# Patient Record
Sex: FEMALE | Race: WHITE | Employment: FULL TIME | URBAN - METROPOLITAN AREA
[De-identification: names, ages, dates, MRNs, and addresses within clinical notes are randomized per-mention and may not be internally consistent; named-entity substitution may affect disease eponyms.]

---

## 2018-11-12 ENCOUNTER — HOSPITAL ENCOUNTER (OUTPATIENT)
Dept: CT IMAGING | Age: 28
Discharge: HOME | End: 2018-11-12
Attending: NURSE PRACTITIONER
Payer: COMMERCIAL

## 2018-11-12 DIAGNOSIS — S09.90XA HEAD INJURY: ICD-10-CM

## 2018-11-12 DIAGNOSIS — R11.2 NAUSEA & VOMITING: ICD-10-CM

## 2018-11-12 DIAGNOSIS — M43.6 NECK STIFFNESS: ICD-10-CM

## 2018-11-12 PROCEDURE — 72125 CT NECK SPINE W/O DYE: CPT

## 2018-11-12 PROCEDURE — 70450 CT HEAD/BRAIN W/O DYE: CPT

## 2022-11-02 ENCOUNTER — OFFICE VISIT (OUTPATIENT)
Dept: ENT CLINIC | Age: 32
End: 2022-11-02

## 2022-11-02 VITALS — WEIGHT: 130 LBS | BODY MASS INDEX: 24.55 KG/M2 | RESPIRATION RATE: 18 BRPM | HEIGHT: 61 IN

## 2022-11-02 DIAGNOSIS — S01.311A LACERATION OF EARLOBE, RIGHT, INITIAL ENCOUNTER: Primary | ICD-10-CM

## 2022-11-02 RX ORDER — ESCITALOPRAM OXALATE 5 MG/1
TABLET ORAL DAILY
COMMUNITY
Start: 2022-06-30 | End: 2023-06-30

## 2022-11-02 ASSESSMENT — ENCOUNTER SYMPTOMS
APNEA: 0
COLOR CHANGE: 0
EYE DISCHARGE: 0
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
## 2022-11-09 ENCOUNTER — OFFICE VISIT (OUTPATIENT)
Dept: ENT CLINIC | Age: 32
End: 2022-11-09

## 2022-11-09 VITALS — RESPIRATION RATE: 18 BRPM | HEIGHT: 61 IN | BODY MASS INDEX: 24.92 KG/M2 | WEIGHT: 132 LBS

## 2022-11-09 DIAGNOSIS — S01.311D LACERATION OF RIGHT EAR LOBE, SUBSEQUENT ENCOUNTER: Primary | ICD-10-CM

## 2022-11-09 RX ORDER — CEFUROXIME AXETIL 500 MG/1
500 TABLET ORAL 2 TIMES DAILY
Qty: 10 TABLET | Refills: 0 | Status: SHIPPED | OUTPATIENT
Start: 2022-11-09 | End: 2022-11-14

## 2022-11-09 ASSESSMENT — ENCOUNTER SYMPTOMS
COLOR CHANGE: 0
EYE DISCHARGE: 0
APNEA: 0
ABDOMINAL DISTENTION: 0

## 2022-11-09 NOTE — PROGRESS NOTES
HPI:  Alexandre Anaya is a 29 y.o. female seen Established   Chief Complaint   Patient presents with    Ear Problem     Patient presents today for ear lob repair. 82-year-old female presents for a follow-up evaluation today for procedure for repair of right-sided split earlobe with Z-plasty reconstruction. There is been no changes and continues to have deformity sustained while at work with traumatic injury of right-sided split earlobe from her ear piercing. Past Medical History, Past Surgical History, Family history, Social History, and Medications were all reviewed with the patient today and updated as necessary. No Known Allergies    There is no problem list on file for this patient. Current Outpatient Medications   Medication Sig    cefUROXime (CEFTIN) 500 MG tablet Take 1 tablet by mouth 2 times daily for 5 days    mupirocin (BACTROBAN) 2 % ointment Apply topically 3 times daily. escitalopram (LEXAPRO) 5 MG tablet Take by mouth daily     No current facility-administered medications for this visit. History reviewed. No pertinent past medical history. History reviewed. No pertinent surgical history. Social History     Tobacco Use    Smoking status: Never    Smokeless tobacco: Never   Substance Use Topics    Alcohol use: Yes     Comment: socially       History reviewed. No pertinent family history. ROS:    Review of Systems   Constitutional:  Negative for activity change. HENT:  Negative for congestion. Eyes:  Negative for discharge. Respiratory:  Negative for apnea. Cardiovascular:  Negative for chest pain. Gastrointestinal:  Negative for abdominal distention. Endocrine: Negative for cold intolerance. Genitourinary:  Negative for difficulty urinating. Musculoskeletal:  Negative for arthralgias. Skin:  Negative for color change. Allergic/Immunologic: Negative for environmental allergies. Neurological:  Negative for dizziness.    Hematological: Negative for adenopathy. Psychiatric/Behavioral:  Negative for agitation. PHYSICAL EXAM:    Resp 18   Ht 5' 1\" (1.549 m)   Wt 132 lb (59.9 kg)   BMI 24.94 kg/m²     Physical Exam  Vitals and nursing note reviewed. Constitutional:       Appearance: Normal appearance. HENT:      Head: Normocephalic and atraumatic. Right Ear: Tympanic membrane and ear canal normal. There is no impacted cerumen. Left Ear: Tympanic membrane, ear canal and external ear normal. There is no impacted cerumen. Ears:      Comments: Right-sided external ear lobule split skin deformity well-healed. Nose: Nose normal. No congestion or rhinorrhea. Mouth/Throat:      Mouth: Mucous membranes are moist.      Pharynx: Oropharynx is clear. No oropharyngeal exudate or posterior oropharyngeal erythema. Eyes:      General: No scleral icterus. Pulmonary:      Effort: Pulmonary effort is normal.   Musculoskeletal:      Cervical back: Normal range of motion and neck supple. No rigidity. Lymphadenopathy:      Cervical: No cervical adenopathy. Skin:     General: Skin is warm and dry. Neurological:      Mental Status: She is alert and oriented to person, place, and time. Psychiatric:         Mood and Affect: Mood normal.         Behavior: Behavior normal.          Procedure: Right-sided split earlobe repair via Z-plasty reconstruction  Indication: Right-sided split earlobe deformity  Description: Long discussion of all risk benefits and alternatives to Z-plasty earlobe repair reviewed and informed consent was obtained and signed. A total of 1 cc of 2% lidocaine with 1 100,000 epinephrine was instilled at the medial base of the earlobe to perform a anesthetized block to the inferior one third of the external ear. After sufficient amount of time it was confirmed to have anesthesia effect. A marking pen was then used to delineate the scar tissue of the split earlobe and a Z-plasty preparation.   Iodine solution was used to cleanse the earlobe and surrounding soft tissue. Exam showed right-sided earlobe well-healed split defect approximately 2 cm in total length. Next #15 blade scalpel was then used to trim the remove scar tissue from the split edges. #15 blade scalpel was then also used to perform Z-plasty cuts through the full-thickness of earlobe. The entirety of the Z-plasty length was approximately 2.5 cm in total linear dimensions. Minimal bloody ooze was controlled with silver nitrate stick cauterization and gauze pressure. Next the Z-plasty soft tissue was undermined and elevated and soft tissue was then rotated via local rotational flap with good reapproximation of tissues and a more normal anatomical appearance of the earlobe. The skin edges were then reapproximated using simple interrupted 6-0 Vicryl suture and a vertical mattress suture was then placed at the rim of the inferior border of the lobule to ensure good skin and skin approximation and eversion. The area was then cleansed with sterile saline and antibiotic ointment was applied and covered with a Band-Aid. Patient tolerated well no complications. ASSESSMENT and PLAN        ICD-10-CM    1. Laceration of right ear lobe, subsequent encounter  S01.311D           Patient underwent repair of right sided earlobe split deformity via Z-plasty repair as above with no complications. She should place Bactroban ointment to the sutures and incision site 3 times daily and take her prophylactic antibiotics as prescribed.   Follow-up in 6 days for suture removal.    Brent Bowman DO  11/10/2022

## 2022-11-15 ENCOUNTER — OFFICE VISIT (OUTPATIENT)
Dept: ENT CLINIC | Age: 32
End: 2022-11-15

## 2022-11-15 VITALS — BODY MASS INDEX: 24.92 KG/M2 | WEIGHT: 132 LBS | HEIGHT: 61 IN

## 2022-11-15 DIAGNOSIS — S01.311D LACERATION OF RIGHT EAR LOBE, SUBSEQUENT ENCOUNTER: Primary | ICD-10-CM

## 2022-11-15 PROCEDURE — 99024 POSTOP FOLLOW-UP VISIT: CPT | Performed by: STUDENT IN AN ORGANIZED HEALTH CARE EDUCATION/TRAINING PROGRAM

## 2022-11-15 NOTE — PROGRESS NOTES
drainage or edema present. ASSESSMENT and PLAN        ICD-10-CM    1. Laceration of right ear lobe, subsequent encounter  S01.311D           Following suture removal 2 additional simple interrupted sutures were placed at 2 small locations of dehiscence which is most likely following her significant earlobe swelling event this past weekend. We discussed that this may have been a temporary postoperative infection that is now resolved. This will help with cosmesis long-term. Recommend continued mupirocin ointment 3 times daily and I have sent a new prescription for oral antibiotics Bactrim twice daily for 5 days.   Follow-up in 1 week for suture removal.    Nancy Durham DO  11/16/2022

## 2022-11-16 RX ORDER — SULFAMETHOXAZOLE AND TRIMETHOPRIM 400; 80 MG/1; MG/1
1 TABLET ORAL 2 TIMES DAILY
Qty: 10 TABLET | Refills: 0 | Status: SHIPPED | OUTPATIENT
Start: 2022-11-16 | End: 2022-11-21

## 2022-11-21 ENCOUNTER — OFFICE VISIT (OUTPATIENT)
Dept: ENT CLINIC | Age: 32
End: 2022-11-21

## 2022-11-21 VITALS — WEIGHT: 132 LBS | HEIGHT: 61 IN | RESPIRATION RATE: 18 BRPM | BODY MASS INDEX: 24.92 KG/M2

## 2022-11-21 DIAGNOSIS — S01.311D LACERATION OF RIGHT EAR LOBE, SUBSEQUENT ENCOUNTER: Primary | ICD-10-CM

## 2022-11-21 PROCEDURE — 99024 POSTOP FOLLOW-UP VISIT: CPT | Performed by: STUDENT IN AN ORGANIZED HEALTH CARE EDUCATION/TRAINING PROGRAM

## 2022-11-21 ASSESSMENT — ENCOUNTER SYMPTOMS
ABDOMINAL DISTENTION: 0
APNEA: 0
EYE DISCHARGE: 0
COLOR CHANGE: 0

## 2022-11-21 NOTE — PROGRESS NOTES
HPI:  Bety Obrien is a 29 y.o. female seen Established   Chief Complaint   Patient presents with    Follow-up     Patient presents today for suture removal .          26-year-old female presents for follow-up evaluation 1 week status post placement of 2 sutures to the right earlobe now approximately just under 2-week status post earlobe repair for history of split earlobe. She has been doing well having had no additional acute swelling or pain to the right ear. Minimal tenderness. She continues to use antibiotic ointment. Presents today for suture removal.    Past Medical History, Past Surgical History, Family history, Social History, and Medications were all reviewed with the patient today and updated as necessary. No Known Allergies    There is no problem list on file for this patient. Current Outpatient Medications   Medication Sig    escitalopram (LEXAPRO) 5 MG tablet Take by mouth daily     No current facility-administered medications for this visit. History reviewed. No pertinent past medical history. History reviewed. No pertinent surgical history. Social History     Tobacco Use    Smoking status: Never    Smokeless tobacco: Never   Substance Use Topics    Alcohol use: Yes     Comment: socially       History reviewed. No pertinent family history. ROS:    Review of Systems   Constitutional:  Negative for activity change. HENT:  Negative for congestion. Eyes:  Negative for discharge. Respiratory:  Negative for apnea. Cardiovascular:  Negative for chest pain. Gastrointestinal:  Negative for abdominal distention. Endocrine: Negative for cold intolerance. Genitourinary:  Negative for difficulty urinating. Musculoskeletal:  Negative for arthralgias. Skin:  Negative for color change. Allergic/Immunologic: Negative for environmental allergies. Neurological:  Negative for dizziness. Hematological:  Negative for adenopathy.    Psychiatric/Behavioral:  Negative for agitation. PHYSICAL EXAM:    Resp 18   Ht 5' 1\" (1.549 m)   Wt 132 lb (59.9 kg)   BMI 24.94 kg/m²     Physical Exam     Right earlobe incision clean dry and intact with minimal crusting. No erythema or drainage. Minimal tenderness. 2 single interrupted Prolene sutures cut and removed. Coated with antibiotic ointment. ASSESSMENT and PLAN        ICD-10-CM    1. Laceration of right ear lobe, subsequent encounter  S01.311D           Patient was reassured of a good normal expected closure of the incision site without evidence of soft tissue infection. Good cosmetic appearance. She can continue with mupirocin ointment twice daily for 2 days and then we have discussed long-term protection from sun exposure and using sunscreens. She can follow-up in the future if she has any further concerns.     Judge Ayala,   11/22/2022

## 2023-02-23 ENCOUNTER — APPOINTMENT (RX ONLY)
Dept: URBAN - METROPOLITAN AREA CLINIC 329 | Facility: CLINIC | Age: 33
Setting detail: DERMATOLOGY
End: 2023-02-23

## 2023-02-23 DIAGNOSIS — D22 MELANOCYTIC NEVI: ICD-10-CM

## 2023-02-23 DIAGNOSIS — L20.89 OTHER ATOPIC DERMATITIS: ICD-10-CM

## 2023-02-23 DIAGNOSIS — Q826 OTHER SPECIFIED ANOMALIES OF SKIN: ICD-10-CM

## 2023-02-23 DIAGNOSIS — Q819 OTHER SPECIFIED ANOMALIES OF SKIN: ICD-10-CM

## 2023-02-23 DIAGNOSIS — Q828 OTHER SPECIFIED ANOMALIES OF SKIN: ICD-10-CM

## 2023-02-23 DIAGNOSIS — D18.0 HEMANGIOMA: ICD-10-CM

## 2023-02-23 PROBLEM — D22.62 MELANOCYTIC NEVI OF LEFT UPPER LIMB, INCLUDING SHOULDER: Status: ACTIVE | Noted: 2023-02-23

## 2023-02-23 PROBLEM — D18.01 HEMANGIOMA OF SKIN AND SUBCUTANEOUS TISSUE: Status: ACTIVE | Noted: 2023-02-23

## 2023-02-23 PROBLEM — L85.8 OTHER SPECIFIED EPIDERMAL THICKENING: Status: ACTIVE | Noted: 2023-02-23

## 2023-02-23 PROCEDURE — 99203 OFFICE O/P NEW LOW 30 MIN: CPT

## 2023-02-23 PROCEDURE — ? SUNSCREEN RECOMMENDATIONS

## 2023-02-23 PROCEDURE — ? FULL BODY SKIN EXAM - DECLINED

## 2023-02-23 PROCEDURE — ? PRESCRIPTION

## 2023-02-23 PROCEDURE — ? ADDITIONAL NOTES

## 2023-02-23 PROCEDURE — ? COUNSELING

## 2023-02-23 RX ORDER — TRIAMCINOLONE ACETONIDE 1 MG/G
CREAM TOPICAL BID
Qty: 80 | Refills: 3 | Status: ERX | COMMUNITY
Start: 2023-02-23

## 2023-02-23 RX ADMIN — TRIAMCINOLONE ACETONIDE: 1 CREAM TOPICAL at 00:00

## 2023-02-23 ASSESSMENT — LOCATION DETAILED DESCRIPTION DERM
LOCATION DETAILED: LEFT ANTERIOR SHOULDER
LOCATION DETAILED: LEFT PROXIMAL DORSAL FOREARM
LOCATION DETAILED: LEFT ANTERIOR PROXIMAL THIGH

## 2023-02-23 ASSESSMENT — LOCATION SIMPLE DESCRIPTION DERM
LOCATION SIMPLE: LEFT FOREARM
LOCATION SIMPLE: LEFT THIGH
LOCATION SIMPLE: LEFT SHOULDER

## 2023-02-23 ASSESSMENT — LOCATION ZONE DERM
LOCATION ZONE: LEG
LOCATION ZONE: ARM

## 2023-02-23 NOTE — PROCEDURE: ADDITIONAL NOTES
Render Risk Assessment In Note?: no
Additional Notes: Suggested Uridin Podose to use on red areas.
Detail Level: Simple
Yes

## 2023-02-23 NOTE — HPI: RASH
How Severe Is Your Rash?: mild
Is This A New Presentation, Or A Follow-Up?: Rash
Additional History: Patient reports having a family hx of psoriasis and is concerned that this rash may be psoriasis, she states that the patches are itchy and red. She has only used OTC products for this issue. She is present to discuss tx option.

## 2023-03-28 ENCOUNTER — APPOINTMENT (RX ONLY)
Dept: URBAN - METROPOLITAN AREA CLINIC 329 | Facility: CLINIC | Age: 33
Setting detail: DERMATOLOGY
End: 2023-03-28

## 2023-03-28 DIAGNOSIS — D22 MELANOCYTIC NEVI: ICD-10-CM

## 2023-03-28 DIAGNOSIS — D18.0 HEMANGIOMA: ICD-10-CM

## 2023-03-28 DIAGNOSIS — Q828 OTHER SPECIFIED ANOMALIES OF SKIN: ICD-10-CM

## 2023-03-28 DIAGNOSIS — L21.8 OTHER SEBORRHEIC DERMATITIS: ICD-10-CM

## 2023-03-28 DIAGNOSIS — L81.4 OTHER MELANIN HYPERPIGMENTATION: ICD-10-CM

## 2023-03-28 DIAGNOSIS — L82.1 OTHER SEBORRHEIC KERATOSIS: ICD-10-CM

## 2023-03-28 DIAGNOSIS — L24 IRRITANT CONTACT DERMATITIS: ICD-10-CM

## 2023-03-28 DIAGNOSIS — Q826 OTHER SPECIFIED ANOMALIES OF SKIN: ICD-10-CM

## 2023-03-28 DIAGNOSIS — Q819 OTHER SPECIFIED ANOMALIES OF SKIN: ICD-10-CM

## 2023-03-28 PROBLEM — L85.8 OTHER SPECIFIED EPIDERMAL THICKENING: Status: ACTIVE | Noted: 2023-03-28

## 2023-03-28 PROBLEM — L24.9 IRRITANT CONTACT DERMATITIS, UNSPECIFIED CAUSE: Status: ACTIVE | Noted: 2023-03-28

## 2023-03-28 PROBLEM — D22.5 MELANOCYTIC NEVI OF TRUNK: Status: ACTIVE | Noted: 2023-03-28

## 2023-03-28 PROBLEM — D18.01 HEMANGIOMA OF SKIN AND SUBCUTANEOUS TISSUE: Status: ACTIVE | Noted: 2023-03-28

## 2023-03-28 PROCEDURE — 99213 OFFICE O/P EST LOW 20 MIN: CPT

## 2023-03-28 PROCEDURE — ? TREATMENT REGIMEN

## 2023-03-28 PROCEDURE — ? FULL BODY SKIN EXAM

## 2023-03-28 PROCEDURE — ? PRESCRIPTION

## 2023-03-28 PROCEDURE — ? COUNSELING

## 2023-03-28 RX ORDER — HYDROCORTISONE 25 MG/G
CREAM TOPICAL
Qty: 30 | Refills: 1 | Status: ERX | COMMUNITY
Start: 2023-03-28

## 2023-03-28 RX ORDER — KETOCONAZOLE 20 MG/ML
SHAMPOO, SUSPENSION TOPICAL
Qty: 120 | Refills: 3 | Status: ERX | COMMUNITY
Start: 2023-03-28

## 2023-03-28 RX ADMIN — KETOCONAZOLE: 20 SHAMPOO, SUSPENSION TOPICAL at 00:00

## 2023-03-28 RX ADMIN — HYDROCORTISONE: 25 CREAM TOPICAL at 00:00

## 2023-03-28 ASSESSMENT — LOCATION DETAILED DESCRIPTION DERM
LOCATION DETAILED: RIGHT MEDIAL UPPER BACK
LOCATION DETAILED: LEFT INFERIOR UPPER BACK
LOCATION DETAILED: LEFT MEDIAL UPPER BACK
LOCATION DETAILED: LEFT PROXIMAL PRETIBIAL REGION
LOCATION DETAILED: INFERIOR THORACIC SPINE
LOCATION DETAILED: LEFT MEDIAL FRONTAL SCALP
LOCATION DETAILED: LEFT SUPERIOR LATERAL BUCCAL CHEEK
LOCATION DETAILED: LEFT CENTRAL PARIETAL SCALP

## 2023-03-28 ASSESSMENT — LOCATION ZONE DERM
LOCATION ZONE: SCALP
LOCATION ZONE: LEG
LOCATION ZONE: FACE
LOCATION ZONE: TRUNK

## 2023-03-28 ASSESSMENT — LOCATION SIMPLE DESCRIPTION DERM
LOCATION SIMPLE: RIGHT UPPER BACK
LOCATION SIMPLE: LEFT PRETIBIAL REGION
LOCATION SIMPLE: SCALP
LOCATION SIMPLE: UPPER BACK
LOCATION SIMPLE: LEFT SCALP
LOCATION SIMPLE: LEFT UPPER BACK
LOCATION SIMPLE: LEFT CHEEK

## 2023-03-28 NOTE — PROCEDURE: TREATMENT REGIMEN
Initiate Treatment: Hydrocortisone- apply to face daily for the next two weeks
Detail Level: Zone
Initiate Treatment: Ketoconazole shampoo- wash scalp with shampoo 3xs a week. Leave in scalp for 5 minutes, then rinse.

## 2023-03-28 NOTE — PROCEDURE: MIPS QUALITY
Detail Level: Zone
Quality 431: Preventive Care And Screening: Unhealthy Alcohol Use - Screening: Patient not identified as an unhealthy alcohol user when screened for unhealthy alcohol use using a systematic screening method
Quality 130: Documentation Of Current Medications In The Medical Record: Current Medications Documented
Quality 226: Preventive Care And Screening: Tobacco Use: Screening And Cessation Intervention: Patient screened for tobacco use and is an ex/non-smoker
Quality 110: Preventive Care And Screening: Influenza Immunization: Influenza Immunization Administered during Influenza season

## 2023-04-10 ENCOUNTER — APPOINTMENT (RX ONLY)
Dept: URBAN - METROPOLITAN AREA CLINIC 329 | Facility: CLINIC | Age: 33
Setting detail: DERMATOLOGY
End: 2023-04-10

## 2023-04-10 DIAGNOSIS — L68.0 HIRSUTISM: ICD-10-CM | Status: INADEQUATELY CONTROLLED

## 2023-04-10 DIAGNOSIS — L21.8 OTHER SEBORRHEIC DERMATITIS: ICD-10-CM | Status: STABLE

## 2023-04-10 DIAGNOSIS — L24 IRRITANT CONTACT DERMATITIS: ICD-10-CM | Status: IMPROVED

## 2023-04-10 PROBLEM — L24.9 IRRITANT CONTACT DERMATITIS, UNSPECIFIED CAUSE: Status: ACTIVE | Noted: 2023-04-10

## 2023-04-10 PROCEDURE — ? ADDITIONAL NOTES

## 2023-04-10 PROCEDURE — ? FULL BODY SKIN EXAM - DECLINED

## 2023-04-10 PROCEDURE — ? COUNSELING

## 2023-04-10 PROCEDURE — ? PRESCRIPTION MEDICATION MANAGEMENT

## 2023-04-10 PROCEDURE — 99213 OFFICE O/P EST LOW 20 MIN: CPT

## 2023-04-10 ASSESSMENT — LOCATION SIMPLE DESCRIPTION DERM
LOCATION SIMPLE: SUBMENTAL CHIN
LOCATION SIMPLE: LEFT SCALP
LOCATION SIMPLE: LEFT CHEEK

## 2023-04-10 ASSESSMENT — LOCATION DETAILED DESCRIPTION DERM
LOCATION DETAILED: LEFT MEDIAL FRONTAL SCALP
LOCATION DETAILED: SUBMENTAL CHIN
LOCATION DETAILED: LEFT SUPERIOR LATERAL BUCCAL CHEEK

## 2023-04-10 ASSESSMENT — LOCATION ZONE DERM
LOCATION ZONE: FACE
LOCATION ZONE: SCALP

## 2023-04-10 NOTE — PROCEDURE: PRESCRIPTION MEDICATION MANAGEMENT
Detail Level: Zone
Plan: Will treat revisit rosacea once off hydrocortisone. May also consider Tretinoin once clear.
Discontinue Regimen: Hydrocortisone
Render In Strict Bullet Format?: No
Continue Regimen: Ketoconazole shampoo prn flares

## 2023-04-10 NOTE — PROCEDURE: ADDITIONAL NOTES
Additional Notes: Recommend laser for hair removal. Will consult obgyn for possible pcos work up.
Render Risk Assessment In Note?: no
Detail Level: Simple

## 2023-07-26 ENCOUNTER — TRANSCRIBE ORDERS (OUTPATIENT)
Dept: OCCUPATIONAL MEDICINE | Age: 33
End: 2023-07-26

## 2023-07-26 ENCOUNTER — HOSPITAL ENCOUNTER (OUTPATIENT)
Dept: GENERAL RADIOLOGY | Age: 33
Discharge: HOME | End: 2023-07-29

## 2023-07-26 DIAGNOSIS — T14.90XA INJURY: Primary | ICD-10-CM

## 2023-07-26 DIAGNOSIS — T14.90XA INJURY: ICD-10-CM

## 2023-07-26 PROCEDURE — 73140 X-RAY EXAM OF FINGER(S): CPT

## 2024-01-07 ENCOUNTER — HOSPITAL ENCOUNTER (EMERGENCY)
Age: 34
Discharge: HOME | End: 2024-01-07
Payer: OTHER MISCELLANEOUS

## 2024-01-07 ENCOUNTER — APPOINTMENT (OUTPATIENT)
Dept: GENERAL RADIOLOGY | Age: 34
End: 2024-01-07
Payer: OTHER MISCELLANEOUS

## 2024-01-07 VITALS
HEART RATE: 101 BPM | SYSTOLIC BLOOD PRESSURE: 122 MMHG | OXYGEN SATURATION: 97 % | BODY MASS INDEX: 23.98 KG/M2 | DIASTOLIC BLOOD PRESSURE: 87 MMHG | HEIGHT: 61 IN | RESPIRATION RATE: 19 BRPM | TEMPERATURE: 98.4 F | WEIGHT: 127 LBS

## 2024-01-07 DIAGNOSIS — S62.610A CLOSED DISPLACED FRACTURE OF PROXIMAL PHALANX OF RIGHT INDEX FINGER, INITIAL ENCOUNTER: Primary | ICD-10-CM
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
## 2024-01-07 PROCEDURE — 29125 APPL SHORT ARM SPLINT STATIC: CPT

## 2024-01-07 PROCEDURE — 99283 EMERGENCY DEPT VISIT LOW MDM: CPT

## 2024-01-07 PROCEDURE — 6370000000 HC RX 637 (ALT 250 FOR IP): Performed by: PHYSICIAN ASSISTANT

## 2024-01-07 PROCEDURE — 73130 X-RAY EXAM OF HAND: CPT

## 2024-01-07 RX ORDER — TRAMADOL HYDROCHLORIDE 50 MG/1
50 TABLET ORAL
Status: COMPLETED | OUTPATIENT
Start: 2024-01-07 | End: 2024-01-07

## 2024-01-07 RX ORDER — TRAMADOL HYDROCHLORIDE 50 MG/1
50 TABLET ORAL EVERY 8 HOURS PRN
Qty: 12 TABLET | Refills: 0 | Status: SHIPPED | OUTPATIENT
Start: 2024-01-07 | End: 2024-01-10

## 2024-01-07 RX ADMIN — TRAMADOL HYDROCHLORIDE 50 MG: 50 TABLET ORAL at 21:21

## 2024-01-07 ASSESSMENT — PAIN - FUNCTIONAL ASSESSMENT: PAIN_FUNCTIONAL_ASSESSMENT: 0-10

## 2024-01-07 ASSESSMENT — PAIN SCALES - GENERAL: PAINLEVEL_OUTOF10: 8

## 2024-01-07 ASSESSMENT — LIFESTYLE VARIABLES
HOW MANY STANDARD DRINKS CONTAINING ALCOHOL DO YOU HAVE ON A TYPICAL DAY: PATIENT DOES NOT DRINK
HOW OFTEN DO YOU HAVE A DRINK CONTAINING ALCOHOL: NEVER

## 2024-01-08 ENCOUNTER — OFFICE VISIT (OUTPATIENT)
Dept: ORTHOPEDIC SURGERY | Age: 34
End: 2024-01-08

## 2024-01-08 ENCOUNTER — EVALUATION (OUTPATIENT)
Age: 34
End: 2024-01-08

## 2024-01-08 DIAGNOSIS — S62.392A CLOSED NONDISPLACED FRACTURE OF OTHER PART OF THIRD METACARPAL BONE OF RIGHT HAND, INITIAL ENCOUNTER: ICD-10-CM

## 2024-01-08 DIAGNOSIS — S62.640A NONDISPLACED FRACTURE OF PROXIMAL PHALANX OF RIGHT INDEX FINGER, INITIAL ENCOUNTER FOR CLOSED FRACTURE: Primary | ICD-10-CM

## 2024-01-08 DIAGNOSIS — M79.641 PAIN OF RIGHT HAND: Primary | ICD-10-CM

## 2024-01-08 DIAGNOSIS — S62.640A NONDISPLACED FRACTURE OF PROXIMAL PHALANX OF RIGHT INDEX FINGER, INITIAL ENCOUNTER FOR CLOSED FRACTURE: ICD-10-CM

## 2024-01-08 DIAGNOSIS — S62.644A NONDISPLACED FRACTURE OF PROXIMAL PHALANX OF RIGHT RING FINGER, INITIAL ENCOUNTER FOR CLOSED FRACTURE: ICD-10-CM

## 2024-01-08 DIAGNOSIS — M25.641 STIFFNESS OF RIGHT HAND JOINT: ICD-10-CM

## 2024-01-08 PROCEDURE — L3913 HFO W/O JOINTS CF: HCPCS | Performed by: OCCUPATIONAL THERAPIST

## 2024-01-08 PROCEDURE — 99204 OFFICE O/P NEW MOD 45 MIN: CPT | Performed by: ORTHOPAEDIC SURGERY

## 2024-01-08 NOTE — ED NOTES
I have reviewed discharge instructions with the patient.  The patient verbalized understanding.    Patient left ED via Discharge Method: ambulatory to Home with family. Opportunity for questions and clarification provided.       Patient given 1 scripts.         To continue your aftercare when you leave the hospital, you may receive an automated call from our care team to check in on how you are doing.  This is a free service and part of our promise to provide the best care and service to meet your aftercare needs.” If you have questions, or wish to unsubscribe from this service please call [phone].  Thank you for Choosing our [facility] Emergency Department.
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
## 2024-01-08 NOTE — ED PROVIDER NOTES
Physical Exam  Vitals and nursing note reviewed.   Constitutional:       Appearance: Normal appearance. She is normal weight.   HENT:      Head: Normocephalic and atraumatic.      Right Ear: External ear normal.      Left Ear: External ear normal.      Nose: Nose normal.      Mouth/Throat:      Mouth: Mucous membranes are moist.      Pharynx: Oropharynx is clear.   Eyes:      Extraocular Movements: Extraocular movements intact.      Pupils: Pupils are equal, round, and reactive to light.   Cardiovascular:      Rate and Rhythm: Normal rate and regular rhythm.      Pulses: Normal pulses.      Heart sounds: Normal heart sounds.   Pulmonary:      Effort: Pulmonary effort is normal.      Breath sounds: Normal breath sounds. No rales.   Chest:      Chest wall: No tenderness.   Abdominal:      General: Abdomen is flat. Bowel sounds are normal.      Palpations: Abdomen is soft.      Tenderness: There is no abdominal tenderness.      Hernia: No hernia is present.      Comments: Abdomen flat soft no abrasions or contusions noted no tenderness to deep palpation across the mid abdominal area   Musculoskeletal:         General: Swelling and tenderness present. No deformity. Normal range of motion.      Cervical back: Normal range of motion and neck supple. No tenderness.      Comments: Right hand with swelling over the index and long finger medical carpal heads.  Limited range of motion of the index and long finger due to pain.  No abrasions noted to the dorsum of the hand.  Full range of motion of the right wrist elbow and shoulder.  No numbness into the hand good capillary refill.  Patient has no tenderness to the thoracic or lumbar spine at this time no pain to the lower extremities at this time.   Skin:     General: Skin is warm and dry.   Neurological:      General: No focal deficit present.      Mental Status: She is alert.   Psychiatric:         Mood and Affect: Mood normal.         Behavior: Behavior normal.

## 2024-01-10 ENCOUNTER — EVALUATION (OUTPATIENT)
Age: 34
End: 2024-01-10

## 2024-01-10 DIAGNOSIS — M79.641 PAIN OF RIGHT HAND: Primary | ICD-10-CM

## 2024-01-10 DIAGNOSIS — M25.641 STIFFNESS OF RIGHT HAND JOINT: ICD-10-CM

## 2024-01-10 PROCEDURE — 97165 OT EVAL LOW COMPLEX 30 MIN: CPT | Performed by: OCCUPATIONAL THERAPIST

## 2024-01-10 PROCEDURE — 97110 THERAPEUTIC EXERCISES: CPT | Performed by: OCCUPATIONAL THERAPIST
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
## 2024-01-11 ENCOUNTER — TELEPHONE (OUTPATIENT)
Dept: ORTHOPEDIC SURGERY | Age: 34
End: 2024-01-11

## 2024-01-11 NOTE — TELEPHONE ENCOUNTER
I called and spoke with patient.  She states she was having some numbness but that it got better after she removed the tape.  I advised her she can leave the tape off until she sees Ro again.  Patient voiced understanding.

## 2024-01-17 ENCOUNTER — TREATMENT (OUTPATIENT)
Age: 34
End: 2024-01-17

## 2024-01-17 DIAGNOSIS — M25.641 STIFFNESS OF RIGHT HAND JOINT: ICD-10-CM

## 2024-01-17 DIAGNOSIS — M79.641 PAIN OF RIGHT HAND: Primary | ICD-10-CM
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
## 2024-01-24 ENCOUNTER — TREATMENT (OUTPATIENT)
Age: 34
End: 2024-01-24

## 2024-01-24 DIAGNOSIS — M25.641 STIFFNESS OF RIGHT HAND JOINT: ICD-10-CM

## 2024-01-24 DIAGNOSIS — M79.641 PAIN OF RIGHT HAND: Primary | ICD-10-CM
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
## 2024-01-29 ENCOUNTER — TREATMENT (OUTPATIENT)
Age: 34
End: 2024-01-29
Payer: COMMERCIAL

## 2024-01-29 ENCOUNTER — TELEPHONE (OUTPATIENT)
Age: 34
End: 2024-01-29

## 2024-01-29 ENCOUNTER — OFFICE VISIT (OUTPATIENT)
Dept: ORTHOPEDIC SURGERY | Age: 34
End: 2024-01-29
Payer: COMMERCIAL

## 2024-01-29 DIAGNOSIS — M25.641 STIFFNESS OF RIGHT HAND JOINT: ICD-10-CM

## 2024-01-29 DIAGNOSIS — S62.392A CLOSED NONDISPLACED FRACTURE OF OTHER PART OF THIRD METACARPAL BONE OF RIGHT HAND, INITIAL ENCOUNTER: ICD-10-CM

## 2024-01-29 DIAGNOSIS — M79.641 PAIN OF RIGHT HAND: Primary | ICD-10-CM

## 2024-01-29 DIAGNOSIS — S62.640A NONDISPLACED FRACTURE OF PROXIMAL PHALANX OF RIGHT INDEX FINGER, INITIAL ENCOUNTER FOR CLOSED FRACTURE: Primary | ICD-10-CM

## 2024-01-29 DIAGNOSIS — S62.644A NONDISPLACED FRACTURE OF PROXIMAL PHALANX OF RIGHT RING FINGER, INITIAL ENCOUNTER FOR CLOSED FRACTURE: ICD-10-CM

## 2024-01-29 PROCEDURE — 97110 THERAPEUTIC EXERCISES: CPT | Performed by: OCCUPATIONAL THERAPIST

## 2024-01-29 PROCEDURE — 99213 OFFICE O/P EST LOW 20 MIN: CPT | Performed by: ORTHOPAEDIC SURGERY

## 2024-01-29 NOTE — PROGRESS NOTES
Orthopaedic Hand Clinic Note    Name: Bonny Joaquin  YOB: 1990  Gender: female  MRN: [id number]      Follow up visit:   1. Nondisplaced fracture of proximal phalanx of right index finger, initial encounter for closed fracture    2. Closed nondisplaced fracture of other part of third metacarpal bone of right hand, initial encounter    3. Nondisplaced fracture of proximal phalanx of right ring finger, initial encounter for closed fracture        HPI: Bonny Joaquin is a 33 y.o. female who is following up for right hand injury. Pain has improved somewhat. She has been compliant with use of her brace and therapy.      ROS/Meds/PSH/PMH/FH/SH: I personally reviewed the patients standard intake form.  Pertinents are discussed in the HPI    Physical Examination:    Musculoskeletal Examination:  Examination on the right upper extremity demonstrates cap refill < 5 seconds in all fingers, there is swelling, ecchymosis and tenderness to palpation at the 2-4th MCP joints. She is able to perform limited flexion and extension of all digits. Light touch sensation is intact throughout.     Imaging / Electrodiagnostic Tests:     Hand XR: AP, Lateral, Oblique     Clinical Indication:  1. Nondisplaced fracture of proximal phalanx of right index finger, initial encounter for closed fracture    2. Closed nondisplaced fracture of other part of third metacarpal bone of right hand, initial encounter    3. Nondisplaced fracture of proximal phalanx of right ring finger, initial encounter for closed fracture           Report: AP, lateral, and oblique x-ray of the right hand demonstrates  They demonstrate nondisplaced fractures of the ulnar base of the index and ring proximal phalanx, and middle metacarpal head at the ulnar aspect, which is not articular.  

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
## 2024-02-05 ENCOUNTER — TREATMENT (OUTPATIENT)
Age: 34
End: 2024-02-05
Payer: COMMERCIAL

## 2024-02-05 DIAGNOSIS — M25.641 STIFFNESS OF RIGHT HAND JOINT: ICD-10-CM

## 2024-02-05 DIAGNOSIS — M79.641 PAIN OF RIGHT HAND: Primary | ICD-10-CM

## 2024-02-05 PROCEDURE — 97110 THERAPEUTIC EXERCISES: CPT | Performed by: OCCUPATIONAL THERAPIST

## 2024-02-05 PROCEDURE — 97022 WHIRLPOOL THERAPY: CPT | Performed by: OCCUPATIONAL THERAPIST
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
## 2024-02-08 ENCOUNTER — TREATMENT (OUTPATIENT)
Age: 34
End: 2024-02-08

## 2024-02-08 DIAGNOSIS — M79.641 PAIN OF RIGHT HAND: Primary | ICD-10-CM

## 2024-02-08 DIAGNOSIS — M25.641 STIFFNESS OF RIGHT HAND JOINT: ICD-10-CM
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
## 2024-02-12 ENCOUNTER — TREATMENT (OUTPATIENT)
Age: 34
End: 2024-02-12
Payer: COMMERCIAL

## 2024-02-12 DIAGNOSIS — M79.641 PAIN OF RIGHT HAND: Primary | ICD-10-CM

## 2024-02-12 DIAGNOSIS — M25.641 STIFFNESS OF RIGHT HAND JOINT: ICD-10-CM

## 2024-02-12 PROCEDURE — 97110 THERAPEUTIC EXERCISES: CPT | Performed by: OCCUPATIONAL THERAPIST

## 2024-02-12 PROCEDURE — 97022 WHIRLPOOL THERAPY: CPT | Performed by: OCCUPATIONAL THERAPIST

## 2024-02-14 ENCOUNTER — TREATMENT (OUTPATIENT)
Age: 34
End: 2024-02-14
Payer: COMMERCIAL

## 2024-02-14 DIAGNOSIS — M79.641 PAIN OF RIGHT HAND: Primary | ICD-10-CM

## 2024-02-14 DIAGNOSIS — M25.641 STIFFNESS OF RIGHT HAND JOINT: ICD-10-CM

## 2024-02-14 PROCEDURE — 97022 WHIRLPOOL THERAPY: CPT | Performed by: OCCUPATIONAL THERAPIST

## 2024-02-14 PROCEDURE — 97110 THERAPEUTIC EXERCISES: CPT | Performed by: OCCUPATIONAL THERAPIST
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
## 2024-02-19 ENCOUNTER — TREATMENT (OUTPATIENT)
Age: 34
End: 2024-02-19
Payer: COMMERCIAL

## 2024-02-19 DIAGNOSIS — M79.641 PAIN OF RIGHT HAND: Primary | ICD-10-CM

## 2024-02-19 DIAGNOSIS — M25.641 STIFFNESS OF RIGHT HAND JOINT: ICD-10-CM

## 2024-02-19 PROCEDURE — 97022 WHIRLPOOL THERAPY: CPT | Performed by: OCCUPATIONAL THERAPIST

## 2024-02-19 PROCEDURE — 97110 THERAPEUTIC EXERCISES: CPT | Performed by: OCCUPATIONAL THERAPIST
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
## 2024-02-21 ENCOUNTER — TREATMENT (OUTPATIENT)
Age: 34
End: 2024-02-21

## 2024-02-21 DIAGNOSIS — M79.641 PAIN OF RIGHT HAND: Primary | ICD-10-CM

## 2024-02-21 DIAGNOSIS — M25.641 STIFFNESS OF RIGHT HAND JOINT: ICD-10-CM
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
## 2024-02-26 ENCOUNTER — OFFICE VISIT (OUTPATIENT)
Dept: ORTHOPEDIC SURGERY | Age: 34
End: 2024-02-26
Payer: COMMERCIAL

## 2024-02-26 ENCOUNTER — TREATMENT (OUTPATIENT)
Age: 34
End: 2024-02-26

## 2024-02-26 DIAGNOSIS — M25.641 STIFFNESS OF RIGHT HAND JOINT: ICD-10-CM

## 2024-02-26 DIAGNOSIS — M79.641 PAIN OF RIGHT HAND: Primary | ICD-10-CM

## 2024-02-26 DIAGNOSIS — S62.392A CLOSED NONDISPLACED FRACTURE OF OTHER PART OF THIRD METACARPAL BONE OF RIGHT HAND, INITIAL ENCOUNTER: ICD-10-CM

## 2024-02-26 DIAGNOSIS — S62.640A NONDISPLACED FRACTURE OF PROXIMAL PHALANX OF RIGHT INDEX FINGER, INITIAL ENCOUNTER FOR CLOSED FRACTURE: Primary | ICD-10-CM

## 2024-02-26 DIAGNOSIS — S62.644A NONDISPLACED FRACTURE OF PROXIMAL PHALANX OF RIGHT RING FINGER, INITIAL ENCOUNTER FOR CLOSED FRACTURE: ICD-10-CM

## 2024-02-26 PROCEDURE — 99214 OFFICE O/P EST MOD 30 MIN: CPT | Performed by: ORTHOPAEDIC SURGERY

## 2024-02-26 RX ORDER — METHYLPREDNISOLONE 4 MG/1
TABLET ORAL
Qty: 1 KIT | Refills: 0 | Status: SHIPPED | OUTPATIENT
Start: 2024-02-26

## 2024-02-26 NOTE — PROGRESS NOTES
PATIENT INFORMATION    Anticipatory guidance   Avoid cow's milk until 12 months of age  Avoid infant walkers  Avoid potential choking hazards (large, spherical, or coin shaped foods)  Avoid putting to bed with bottle  Avoid small toys (choking hazard)  Car seat issues (including proper placement)  Caution with possible poisons (including pills, plants, cosmetics)  Child-proof home with cabinet locks, outlet plugs, window guards, and stair safety moreira  Encouraged that any formula used be iron-fortified  Fluoride supplementation if unfluoridated water supply  Importance of varied diet  Make middle-of-night feeds \"brief and boring\"  Never leave unattended  Observe while eating; consider CPR classes  Obtain and know how to use thermometer  Place in crib before completely asleep  Poison Control phone number [phone]  Risk of child pulling down objects on him/herself  Safe sleep furniture  Set hot water heater less than 120 degrees F  Sleeping face up to decrease the chances of SIDS  Smoke detectors  Special weaning formulas rarely useful  Use of transitional object (elvis bear, etc.) to help with sleep  Weaning to cup at 9-12 months of age    Follow-Up  - Return for your 1 year well child visit.    9 months old Health and Safety Tips - The following hyperlinks are available to access via [employer]    Parent Education from Healthy Parent    Educación para padres sobre niños sanos    Common dosing for acetaminophen and ibuprofen:   Acetaminophen (Tylenol) can be given every 4 hours.  · Infant or Children's Elixir: 160mg/5ml for  Weight 6-11 lbs 12-17 lbs 18-23 lbs 24-35 lbs   Dose 1.25 ml 2.5 ml 3.75 ml 5 ml      Weight 36-47 lbs 48-59 lbs 60-71 lsb 72-95 lbs   Dose 7.5 ml 10 ml 12.5 ml 15 ml     Ibuprofen (Motrin) can be given every 6 hours.  Safe for children 6 months and older.  · Infant Drops: 50mg/1.25ml  Weight 12-17 lbs 18-23 lbs   Dose 1.25 ml 1.875 ml     · Children's Elixir 100mg/5ml   Weight 12-17 lbs  18-23 lbs 24-35 lbs 36-47 lbs 48-59 lbs   Dose 2.5 ml 3.75 ml 5 ml 7.5 ml 10 ml        Weight 60-71 lbs 72-95 lbs   Dose 12.5 ml 15 ml     Benadryl  (12.5mg/ 5 ml suspension) - May give 1/2 tsp every 6 hours as needed for allergic reaction or nasal congestion      Additional Educational Resources:  For additional resources regarding your symptoms, diagnosis, or further health information, please visit the Online Health Resources section in [employer]     PATIENT INFORMATION    Additional [employer] phones numbers: Allergy at the [facility] or the [facility]  - Phone # [phone]  (Food Allergy)    Follow-Up  - You have been referred to: allergy.  Please call the office for an appointment.  If you have HMO insurance you will need a referral to take with you to that visit.       -Return for 12 month well visit. Call [phone] to schedule.   Palm Down  - 5 x daily - 7 x weekly - 1 sets - 15 reps - 3 sec hold  - Finger MP Flexion AROM  - 5 x daily - 7 x weekly - 1 sets - 15 reps - 3 sec hold  - Seated Claw Fist AROM  - 5 x daily - 7 x weekly - 1 sets - 15 reps - 3 sec hold    ASSESSMENT     Improvements in MP extension of RF and MF as a result of relative motion orthosis. Continues to report soreness at RF MPJ. Steady progress relative to ROM and participation in writing tasks. Will continue to advance as appropriate.     PLAN      Continue relative motion orthosis to increase MP extension posture of RF, MF.      GOALS     Short term goals: 2/7/2024  (4 weeks)  Patient will be I with HEP and precautions.  Increase AROM of affected right hand MP flexion to at least 55 ° to improve ability to grasp.  Increase AROM of affected right hand MP extension to at least 10 ° to improve ability to open hand to obtain objects.  Increase AROM of affected right hand PIP flexion to at least 60 ° to improve ability to grasp.  Increase AROM of affected right hand PIP extension to at least 10 ° to improve ability to open hand for ADL's.  Decrease edema in right MP circumference by 1.0 cm to improve motion.  Improve Quick DASH functional assessment score from less than 50% deficit.    Long term goals: 3/6/2024  (8 weeks)   Pt will be able to use the affected UE for all ADL's without difficulty.  Pt will have adequate strength to be able to hold and open containers without difficulty.  Pt will be able to make a full composite fist with the involved hand to enable to grasp and hold objects.  Pt will have full active composite extension of affected side to be able to open hand to acquire items for ADL's.  Pt will lack 10 °  or less of PIP extension involved digit.  Minimal edema in involved digit.   Improve Quick DASH functional assessment score from less than 20% deficit.      [employer] Portal     OT Protocols

## 2024-02-26 NOTE — PROGRESS NOTES
Orthopaedic Hand Clinic Note    Name: Bonny Joaquin  YOB: 1990  Gender: female  MRN: [id number]      Follow up visit:   1. Nondisplaced fracture of proximal phalanx of right index finger, initial encounter for closed fracture    2. Closed nondisplaced fracture of other part of third metacarpal bone of right hand, initial encounter    3. Nondisplaced fracture of proximal phalanx of right ring finger, initial encounter for closed fracture        HPI: Bonny Joaquin is a 33 y.o. female who is following up for right hand injury. Pain and motion have been improving, but the knuckles still feel stiff.      ROS/Meds/PSH/PMH/FH/SH: I personally reviewed the patients standard intake form.  Pertinents are discussed in the HPI    Physical Examination:    Musculoskeletal Examination:  Examination on the right upper extremity demonstrates cap refill < 5 seconds in all fingers, there is swelling tenderness to palpation at the 2-4th MCP joints. Finger range of motion is improved. Light touch sensation is intact throughout.      Imaging / Electrodiagnostic Tests:     Hand XR: AP, Lateral, Oblique      Clinical Indication:  1. Nondisplaced fracture of proximal phalanx of right index finger, initial encounter for closed fracture    2. Closed nondisplaced fracture of other part of third metacarpal bone of right hand, initial encounter    3. Nondisplaced fracture of proximal phalanx of right ring finger, initial encounter for closed fracture              Report: AP, lateral, and oblique x-ray of the right hand demonstrates  They demonstrate healing nondisplaced fractures of the ulnar base of the index and ring proximal phalanx, and middle metacarpal head at the ulnar aspect, which is not articular.  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 Nondisplaced fracture of proximal phalanx of right

## 2024-03-04 ENCOUNTER — TREATMENT (OUTPATIENT)
Age: 34
End: 2024-03-04
Payer: COMMERCIAL

## 2024-03-04 DIAGNOSIS — M79.641 PAIN OF RIGHT HAND: Primary | ICD-10-CM

## 2024-03-04 DIAGNOSIS — M25.641 STIFFNESS OF RIGHT HAND JOINT: ICD-10-CM

## 2024-03-04 PROCEDURE — 97110 THERAPEUTIC EXERCISES: CPT | Performed by: OCCUPATIONAL THERAPIST

## 2024-03-04 PROCEDURE — 97022 WHIRLPOOL THERAPY: CPT | Performed by: OCCUPATIONAL THERAPIST

## 2024-03-04 NOTE — PROGRESS NOTES
with Fingers Curled and Palm Down  - 5 x daily - 7 x weekly - 1 sets - 15 reps - 3 sec hold  - Finger MP Flexion AROM  - 5 x daily - 7 x weekly - 1 sets - 15 reps - 3 sec hold  - Seated Claw Fist AROM  - 5 x daily - 7 x weekly - 1 sets - 15 reps - 3 sec hold        ASSESSMENT      Improvements in MP extension of RF and MF as a result of relative motion orthosis. Continues to report soreness at RF MPJ. Steady progress relative to ROM and participation in writing tasks. Will continue to advance as appropriate.      PLAN       Continue relative motion orthosis to increase MP extension posture of RF, MF.       GOALS      Short term goals: 2/7/2024  (4 weeks)  Patient will be I with HEP and precautions.  Increase AROM of affected right hand MP flexion to at least 55 ° to improve ability to grasp.  Increase AROM of affected right hand MP extension to at least 10 ° to improve ability to open hand to obtain objects.  Increase AROM of affected right hand PIP flexion to at least 60 ° to improve ability to grasp.  Increase AROM of affected right hand PIP extension to at least 10 ° to improve ability to open hand for ADL's.  Decrease edema in right MP circumference by 1.0 cm to improve motion.  Improve Quick DASH functional assessment score from less than 50% deficit.     Long term goals: 3/6/2024  (8 weeks)   Pt will be able to use the affected UE for all ADL's without difficulty.  Pt will have adequate strength to be able to hold and open containers without difficulty.  Pt will be able to make a full composite fist with the involved hand to enable to grasp and hold objects.  Pt will have full active composite extension of affected side to be able to open hand to acquire items for ADL's.  Pt will lack 10 °  or less of PIP extension involved digit.  Minimal edema in involved digit.   Improve Quick DASH functional assessment score from less than 20% deficit.        [employer] Portal      OT Protocols

## 2024-03-08 ENCOUNTER — TREATMENT (OUTPATIENT)
Age: 34
End: 2024-03-08
Payer: COMMERCIAL

## 2024-03-08 DIAGNOSIS — M25.641 STIFFNESS OF RIGHT HAND JOINT: ICD-10-CM

## 2024-03-08 DIAGNOSIS — M79.641 PAIN OF RIGHT HAND: Primary | ICD-10-CM

## 2024-03-08 PROCEDURE — 97110 THERAPEUTIC EXERCISES: CPT | Performed by: OCCUPATIONAL THERAPIST

## 2024-03-08 PROCEDURE — 97022 WHIRLPOOL THERAPY: CPT | Performed by: OCCUPATIONAL THERAPIST

## 2024-03-08 NOTE — PROGRESS NOTES
MP Flexion AROM  - 5 x daily - 7 x weekly - 1 sets - 15 reps - 3 sec hold  - Seated Claw Fist AROM  - 5 x daily - 7 x weekly - 1 sets - 15 reps - 3 sec hold    ASSESSMENT     Patient with significantly improved function and ROM this date.  Is using the hand more and more, handwriting improving.  PLAN      Wean from splinting.  New relative motion orthosis to increase MP extension MF/RF.  Wear PRN.  Take measurements    GOALS     Short term goals: 2/7/2024  (4 weeks)  Patient will be I with HEP and precautions.  Increase AROM of affected right hand MP flexion to at least 55 ° to improve ability to grasp.  Increase AROM of affected right hand MP extension to at least 10 ° to improve ability to open hand to obtain objects.  Increase AROM of affected right hand PIP flexion to at least 60 ° to improve ability to grasp.  Increase AROM of affected right hand PIP extension to at least 10 ° to improve ability to open hand for ADL's.  Decrease edema in right MP circumference by 1.0 cm to improve motion.  Improve Quick DASH functional assessment score from less than 50% deficit.    Long term goals: 3/6/2024  (8 weeks)   Pt will be able to use the affected UE for all ADL's without difficulty.  Pt will have adequate strength to be able to hold and open containers without difficulty.  Pt will be able to make a full composite fist with the involved hand to enable to grasp and hold objects.  Pt will have full active composite extension of affected side to be able to open hand to acquire items for ADL's.  Pt will lack 10 °  or less of PIP extension involved digit.  Minimal edema in involved digit.   Improve Quick DASH functional assessment score from less than 20% deficit.      [facility] Portal     OT Protocols

## 2024-03-15 ENCOUNTER — TREATMENT (OUTPATIENT)
Age: 34
End: 2024-03-15
Payer: COMMERCIAL

## 2024-03-15 DIAGNOSIS — M79.641 PAIN OF RIGHT HAND: Primary | ICD-10-CM

## 2024-03-15 DIAGNOSIS — M25.641 STIFFNESS OF RIGHT HAND JOINT: ICD-10-CM

## 2024-03-15 PROCEDURE — 97022 WHIRLPOOL THERAPY: CPT | Performed by: OCCUPATIONAL THERAPIST

## 2024-03-15 PROCEDURE — 97110 THERAPEUTIC EXERCISES: CPT | Performed by: OCCUPATIONAL THERAPIST
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
## 2024-03-18 ENCOUNTER — TREATMENT (OUTPATIENT)
Age: 34
End: 2024-03-18
Payer: COMMERCIAL

## 2024-03-18 DIAGNOSIS — M79.641 PAIN OF RIGHT HAND: Primary | ICD-10-CM

## 2024-03-18 DIAGNOSIS — M25.641 STIFFNESS OF RIGHT HAND JOINT: ICD-10-CM

## 2024-03-18 PROCEDURE — 97110 THERAPEUTIC EXERCISES: CPT | Performed by: OCCUPATIONAL THERAPIST

## 2024-03-18 PROCEDURE — 97022 WHIRLPOOL THERAPY: CPT | Performed by: OCCUPATIONAL THERAPIST
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
## 2024-03-25 ENCOUNTER — TREATMENT (OUTPATIENT)
Age: 34
End: 2024-03-25
Payer: COMMERCIAL

## 2024-03-25 ENCOUNTER — OFFICE VISIT (OUTPATIENT)
Dept: ORTHOPEDIC SURGERY | Age: 34
End: 2024-03-25
Payer: COMMERCIAL

## 2024-03-25 DIAGNOSIS — S62.644A NONDISPLACED FRACTURE OF PROXIMAL PHALANX OF RIGHT RING FINGER, INITIAL ENCOUNTER FOR CLOSED FRACTURE: ICD-10-CM

## 2024-03-25 DIAGNOSIS — S62.392A CLOSED NONDISPLACED FRACTURE OF OTHER PART OF THIRD METACARPAL BONE OF RIGHT HAND, INITIAL ENCOUNTER: ICD-10-CM

## 2024-03-25 DIAGNOSIS — M79.641 PAIN OF RIGHT HAND: Primary | ICD-10-CM

## 2024-03-25 DIAGNOSIS — M25.641 STIFFNESS OF RIGHT HAND JOINT: ICD-10-CM

## 2024-03-25 DIAGNOSIS — S62.640A NONDISPLACED FRACTURE OF PROXIMAL PHALANX OF RIGHT INDEX FINGER, INITIAL ENCOUNTER FOR CLOSED FRACTURE: Primary | ICD-10-CM

## 2024-03-25 PROCEDURE — 97110 THERAPEUTIC EXERCISES: CPT | Performed by: OCCUPATIONAL THERAPIST

## 2024-03-25 PROCEDURE — 99213 OFFICE O/P EST LOW 20 MIN: CPT | Performed by: ORTHOPAEDIC SURGERY

## 2024-03-25 PROCEDURE — 97022 WHIRLPOOL THERAPY: CPT | Performed by: OCCUPATIONAL THERAPIST
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
## 2024-03-26 NOTE — PROGRESS NOTES
Orthopaedic Hand Clinic Note    Name: Bonny Joaquin  YOB: 1990  Gender: female  MRN: [id number]      Follow up visit:   1. Nondisplaced fracture of proximal phalanx of right index finger, initial encounter for closed fracture    2. Closed nondisplaced fracture of other part of third metacarpal bone of right hand, initial encounter    3. Nondisplaced fracture of proximal phalanx of right ring finger, initial encounter for closed fracture        HPI: Bonny Joaquin is a 33 y.o. female who is following up for right hand injury. Pain and motion have improved with the steroid dosepack      ROS/Meds/PSH/PMH/FH/SH: I personally reviewed the patients standard intake form.  Pertinents are discussed in the HPI    Physical Examination:    Musculoskeletal Examination:  Examination on the right upper extremity demonstrates cap refill < 5 seconds in all fingers, finger motion has improved, but still limited particularly at the MCP joints    Imaging / Electrodiagnostic Tests:     Hand XR: AP, Lateral, Oblique      Clinical Indication:  1. Nondisplaced fracture of proximal phalanx of right index finger, initial encounter for closed fracture    2. Closed nondisplaced fracture of other part of third metacarpal bone of right hand, initial encounter    3. Nondisplaced fracture of proximal phalanx of right ring finger, initial encounter for closed fracture              Report: AP, lateral, and oblique x-ray of the right hand demonstrates  They demonstrate healed nondisplaced fractures of the ulnar base of the index and ring proximal phalanx, and middle metacarpal head at the ulnar aspect, which is not articular.  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 Nondisplaced fracture of proximal phalanx of right index finger, initial encounter for closed fracture  S62.640A XR HAND RIGHT (MIN 3 VIEWS)

## 2024-04-01 ENCOUNTER — TREATMENT (OUTPATIENT)
Age: 34
End: 2024-04-01
Payer: COMMERCIAL

## 2024-04-01 DIAGNOSIS — M25.641 STIFFNESS OF RIGHT HAND JOINT: ICD-10-CM

## 2024-04-01 DIAGNOSIS — M79.641 PAIN OF RIGHT HAND: Primary | ICD-10-CM

## 2024-04-01 PROCEDURE — 97110 THERAPEUTIC EXERCISES: CPT | Performed by: OCCUPATIONAL THERAPIST

## 2024-04-01 PROCEDURE — 97022 WHIRLPOOL THERAPY: CPT | Performed by: OCCUPATIONAL THERAPIST
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
## 2024-04-08 ENCOUNTER — TREATMENT (OUTPATIENT)
Age: 34
End: 2024-04-08
Payer: COMMERCIAL

## 2024-04-08 DIAGNOSIS — M79.641 PAIN OF RIGHT HAND: Primary | ICD-10-CM

## 2024-04-08 DIAGNOSIS — M25.641 STIFFNESS OF RIGHT HAND JOINT: ICD-10-CM

## 2024-04-08 PROCEDURE — 97022 WHIRLPOOL THERAPY: CPT | Performed by: OCCUPATIONAL THERAPIST

## 2024-04-08 PROCEDURE — 97110 THERAPEUTIC EXERCISES: CPT | Performed by: OCCUPATIONAL THERAPIST
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
## 2024-04-29 ENCOUNTER — TREATMENT (OUTPATIENT)
Age: 34
End: 2024-04-29
Payer: COMMERCIAL

## 2024-04-29 DIAGNOSIS — M79.641 PAIN OF RIGHT HAND: Primary | ICD-10-CM

## 2024-04-29 DIAGNOSIS — M25.641 STIFFNESS OF RIGHT HAND JOINT: ICD-10-CM

## 2024-04-29 PROCEDURE — 97110 THERAPEUTIC EXERCISES: CPT | Performed by: OCCUPATIONAL THERAPIST

## 2024-04-29 PROCEDURE — 97022 WHIRLPOOL THERAPY: CPT | Performed by: OCCUPATIONAL THERAPIST

## 2024-04-29 NOTE — PROGRESS NOTES
make a full composite fist with the involved hand to enable to grasp and hold objects.  Pt will have full active composite extension of affected side to be able to open hand to acquire items for ADL's.  Pt will lack 10 °  or less of PIP extension involved digit.  Minimal edema in involved digit.   Improve Quick DASH functional assessment score from less than 20% deficit.      [facility] Portal     OT Protocols

## 2024-05-15 ENCOUNTER — TREATMENT (OUTPATIENT)
Age: 34
End: 2024-05-15
Payer: COMMERCIAL

## 2024-05-15 DIAGNOSIS — M79.641 PAIN OF RIGHT HAND: Primary | ICD-10-CM

## 2024-05-15 DIAGNOSIS — M25.641 STIFFNESS OF RIGHT HAND JOINT: ICD-10-CM

## 2024-05-15 PROCEDURE — 97110 THERAPEUTIC EXERCISES: CPT | Performed by: OCCUPATIONAL THERAPIST

## 2024-05-15 PROCEDURE — 97022 WHIRLPOOL THERAPY: CPT | Performed by: OCCUPATIONAL THERAPIST
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
## 2025-05-22 ENCOUNTER — OFFICE VISIT (OUTPATIENT)
Age: 35
End: 2025-05-22
Payer: COMMERCIAL

## 2025-05-22 DIAGNOSIS — R20.2 PARESTHESIAS IN RIGHT HAND: Primary | ICD-10-CM

## 2025-05-22 PROCEDURE — 99213 OFFICE O/P EST LOW 20 MIN: CPT | Performed by: ORTHOPAEDIC SURGERY

## 2025-05-22 NOTE — PROGRESS NOTES
Orthopaedic Hand Surgery Note    Name: Bonny Joaquin  YOB: 1990  Gender: female  MRN: [id number]    CC:  hand numbness      HPI: Patient is a 34 y.o. female with a chief complaint of right hand numbness and tingling in the ulnar nerve distribution. She feels occasional popping in the ring finger which she is not able to reproduce. The symptoms have started recently.  She feels like the hand gets tired and she may drop things..   ROS/Meds/PSH/PMH/FH/SH: I personally reviewed the patients standard intake form.  Pertinents are discussed In the HPI    Physical Examination:    Musculoskeletal:     Examination of the right upper extremity demonstrates Normal sensation to light touch in the ulnar distribution, normal sensation in median and radial distribution, negative carpal tunnel compression testing and Phalen testing, cap refill < 5 seconds in all fingers. Inspection reveals no thenar atrophy, no interosseous atrophy. positive Tinel and elbow flexion compression test of the cubital tunnel, negative Tinel over Guyon's canal. negative Wartenberg sign, no ulnar clawing.  No tenderness to palpation or masses noted in the forearm. No ring finger locking     Imaging / Electrodiagnostic Tests:     none    Assessment:     ICD-10-CM    1. Paresthesias in right hand  R20.2           Plan:  We discussed the diagnosis and different treatment options. We discussed observation, EMG/NCV, night splinting,and surgical decompression and the risks and benefits of all were clearly outlined.  After discussing in detail the patient elects to proceed with referral to OT for ulnar nerve glides, use of a cubital tunnel brace at night.  If symptoms fail to improve over the next 8 weeks, may consider obtaining a nerve conduction study    Patient voiced accordance and understanding of the information provided and the formulated plan. All questions were answered to the patient's satisfaction during the encounter.        Sandrine PIEDRA  In error.

## 2025-05-30 ENCOUNTER — EVALUATION (OUTPATIENT)
Age: 35
End: 2025-05-30
Payer: COMMERCIAL

## 2025-05-30 DIAGNOSIS — R20.2 PARESTHESIAS IN RIGHT HAND: ICD-10-CM

## 2025-05-30 DIAGNOSIS — R20.2 NUMBNESS AND TINGLING: ICD-10-CM

## 2025-05-30 DIAGNOSIS — R20.0 NUMBNESS AND TINGLING: ICD-10-CM

## 2025-05-30 DIAGNOSIS — M79.641 PAIN OF RIGHT HAND: Primary | ICD-10-CM

## 2025-05-30 PROCEDURE — 97110 THERAPEUTIC EXERCISES: CPT | Performed by: OCCUPATIONAL THERAPIST

## 2025-05-30 PROCEDURE — 97166 OT EVAL MOD COMPLEX 45 MIN: CPT | Performed by: OCCUPATIONAL THERAPIST
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
## 2025-06-02 ENCOUNTER — TREATMENT (OUTPATIENT)
Age: 35
End: 2025-06-02
Payer: COMMERCIAL

## 2025-06-02 DIAGNOSIS — R20.2 NUMBNESS AND TINGLING: ICD-10-CM

## 2025-06-02 DIAGNOSIS — R20.0 NUMBNESS AND TINGLING: ICD-10-CM

## 2025-06-02 DIAGNOSIS — M79.641 PAIN OF RIGHT HAND: Primary | ICD-10-CM

## 2025-06-02 PROCEDURE — 97110 THERAPEUTIC EXERCISES: CPT | Performed by: OCCUPATIONAL THERAPIST

## 2025-06-02 PROCEDURE — 97022 WHIRLPOOL THERAPY: CPT | Performed by: OCCUPATIONAL THERAPIST

## 2025-06-02 PROCEDURE — 97140 MANUAL THERAPY 1/> REGIONS: CPT | Performed by: OCCUPATIONAL THERAPIST

## 2025-06-02 PROCEDURE — 97035 APP MDLTY 1+ULTRASOUND EA 15: CPT | Performed by: OCCUPATIONAL THERAPIST

## 2025-06-02 NOTE — PROGRESS NOTES
decrease pain/spasms and swelling  24429- Self-care/home management training to improve activities of daily living skills and compensatory techniques to achieve independence  Modalities prn to address pain, spasms, tissue extensibility and swelling:(85790) Ultrasound/phonophoresis  (88024) Hot/cold pack  (64065) Fluidotherapy  (08414) Paraffin  00011- OT Re-evaluation     GOALS   Short Term Goals:  6/27/2025  (4 weeks)  Patient will report min hypersensitivity to vibration in the hand  Patient will be I with HEP for ROM allowing progression and carryover with UE use during BADL's.  No pain at rest to affected Hand in order to sleep through the night.  Patient will be I with orthosis michelle/doff, wearing schedule and rationale, care and precautions with use     Long Term Goals: 8/22/2025  (12 weeks)  Patient will be able to perform household tasks such as laundry, washing dishes, and food preparation with no modifications using their affected extremity   Patient will have minimal edema in the involved wrist and hand allowing for improved functional grasp  Patient will demonstrate involved side pinch strength of 7 psi or greater to be I with fine motor tasks like buttoning a shirt,  zipping pants, opening bags, and turning a key.  Pt will be able to lift and carry items (ie grocery bags, laundry basket etc) with affected UE with minimal pain  Pt will be able to use the affected UE for all ADL's with  no modifications or compensatory measures.  Pt will have adequate strength to be able to hold and open containers (jars, bags) with  minimal difficulty and without adaptive equipment.  Patient will be able to safely hold/lift/carry at least 20 lbs with the involved UE demonstrating good control and tolerance to load  Patient will be able to sleep with no disturbances and with supportive device of involved upper extremity  Pt will be discharged to  HEP   [facility] Portal  OT Protocols  HEP:   Access Code: [email or web address]  URL:

## 2025-06-04 ENCOUNTER — TREATMENT (OUTPATIENT)
Age: 35
End: 2025-06-04
Payer: COMMERCIAL

## 2025-06-04 DIAGNOSIS — M79.641 PAIN OF RIGHT HAND: Primary | ICD-10-CM

## 2025-06-04 DIAGNOSIS — M25.641 STIFFNESS OF RIGHT HAND JOINT: ICD-10-CM

## 2025-06-04 DIAGNOSIS — R20.0 NUMBNESS AND TINGLING: ICD-10-CM

## 2025-06-04 DIAGNOSIS — M62.81 MUSCLE WEAKNESS (GENERALIZED): ICD-10-CM

## 2025-06-04 DIAGNOSIS — R20.2 NUMBNESS AND TINGLING: ICD-10-CM

## 2025-06-04 PROCEDURE — 97110 THERAPEUTIC EXERCISES: CPT | Performed by: OCCUPATIONAL THERAPIST

## 2025-06-04 PROCEDURE — 97035 APP MDLTY 1+ULTRASOUND EA 15: CPT | Performed by: OCCUPATIONAL THERAPIST

## 2025-06-04 PROCEDURE — 97022 WHIRLPOOL THERAPY: CPT | Performed by: OCCUPATIONAL THERAPIST

## 2025-06-04 PROCEDURE — 97140 MANUAL THERAPY 1/> REGIONS: CPT | Performed by: OCCUPATIONAL THERAPIST
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
## 2025-06-09 ENCOUNTER — TREATMENT (OUTPATIENT)
Age: 35
End: 2025-06-09
Payer: COMMERCIAL

## 2025-06-09 DIAGNOSIS — M25.641 STIFFNESS OF RIGHT HAND JOINT: ICD-10-CM

## 2025-06-09 DIAGNOSIS — R20.0 NUMBNESS AND TINGLING: ICD-10-CM

## 2025-06-09 DIAGNOSIS — M62.81 MUSCLE WEAKNESS (GENERALIZED): ICD-10-CM

## 2025-06-09 DIAGNOSIS — R20.2 NUMBNESS AND TINGLING: ICD-10-CM

## 2025-06-09 DIAGNOSIS — M79.641 PAIN OF RIGHT HAND: Primary | ICD-10-CM

## 2025-06-09 PROCEDURE — 97110 THERAPEUTIC EXERCISES: CPT | Performed by: OCCUPATIONAL THERAPIST

## 2025-06-09 PROCEDURE — 97140 MANUAL THERAPY 1/> REGIONS: CPT | Performed by: OCCUPATIONAL THERAPIST

## 2025-06-09 PROCEDURE — 97022 WHIRLPOOL THERAPY: CPT | Performed by: OCCUPATIONAL THERAPIST

## 2025-06-09 PROCEDURE — 97035 APP MDLTY 1+ULTRASOUND EA 15: CPT | Performed by: OCCUPATIONAL THERAPIST

## 2025-06-09 NOTE — PROGRESS NOTES
pain  Pt will be able to use the affected UE for all ADL's with  no modifications or compensatory measures.  Pt will have adequate strength to be able to hold and open containers (jars, bags) with  minimal difficulty and without adaptive equipment.  Patient will be able to safely hold/lift/carry at least 20 lbs with the involved UE demonstrating good control and tolerance to load  Patient will be able to sleep with no disturbances and with supportive device of involved upper extremity  Pt will be discharged to [facility]  OT Protocols  HEP:   Access Code: [email or web address]  URL: [email or web address]  Date: 05/30/2025  Prepared by: Ro Jenkins    Exercises  - Ulnar Nerve Flossing  - 3 x daily - 7 x weekly - 1 sets - 15 reps - 3 sec hold  - Ulnar Nerve/Median Glide- Low Level  - 3 x daily - 7 x weekly - 1 sets - 15 reps - 3 sec hold  - Finger MP flexion stretch over table top edge  - 5 x daily - 7 x weekly - 1 sets - 10 reps - 10 sec hold  - Hand PROM DIP Flexion   - 5 x daily - 7 x weekly - 1 sets - 10 reps - 10 sec hold  PAST MEDICAL HISTORY/MEDICATIONS/ ALLERGIES   No past medical history on file., No past surgical history on file.   Medications: Reviewed in chart  Allergies: No Known Allergies

## 2025-06-12 ENCOUNTER — TREATMENT (OUTPATIENT)
Age: 35
End: 2025-06-12
Payer: COMMERCIAL

## 2025-06-12 DIAGNOSIS — M79.641 PAIN OF RIGHT HAND: Primary | ICD-10-CM

## 2025-06-12 DIAGNOSIS — M25.641 STIFFNESS OF RIGHT HAND JOINT: ICD-10-CM

## 2025-06-12 DIAGNOSIS — R20.2 NUMBNESS AND TINGLING: ICD-10-CM

## 2025-06-12 DIAGNOSIS — M62.81 MUSCLE WEAKNESS (GENERALIZED): ICD-10-CM

## 2025-06-12 DIAGNOSIS — R20.0 NUMBNESS AND TINGLING: ICD-10-CM

## 2025-06-12 PROCEDURE — 97140 MANUAL THERAPY 1/> REGIONS: CPT | Performed by: OCCUPATIONAL THERAPIST

## 2025-06-12 PROCEDURE — 97110 THERAPEUTIC EXERCISES: CPT | Performed by: OCCUPATIONAL THERAPIST

## 2025-06-12 PROCEDURE — 97022 WHIRLPOOL THERAPY: CPT | Performed by: OCCUPATIONAL THERAPIST

## 2025-06-12 PROCEDURE — 97035 APP MDLTY 1+ULTRASOUND EA 15: CPT | Performed by: OCCUPATIONAL THERAPIST
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
## 2025-06-16 ENCOUNTER — TREATMENT (OUTPATIENT)
Age: 35
End: 2025-06-16
Payer: COMMERCIAL

## 2025-06-16 DIAGNOSIS — M62.81 MUSCLE WEAKNESS (GENERALIZED): ICD-10-CM

## 2025-06-16 DIAGNOSIS — M79.641 PAIN OF RIGHT HAND: Primary | ICD-10-CM

## 2025-06-16 DIAGNOSIS — M25.641 STIFFNESS OF RIGHT HAND JOINT: ICD-10-CM

## 2025-06-16 PROCEDURE — 97022 WHIRLPOOL THERAPY: CPT | Performed by: OCCUPATIONAL THERAPIST

## 2025-06-16 PROCEDURE — 97110 THERAPEUTIC EXERCISES: CPT | Performed by: OCCUPATIONAL THERAPIST

## 2025-06-16 PROCEDURE — 97140 MANUAL THERAPY 1/> REGIONS: CPT | Performed by: OCCUPATIONAL THERAPIST

## 2025-06-18 ENCOUNTER — TREATMENT (OUTPATIENT)
Age: 35
End: 2025-06-18

## 2025-06-18 DIAGNOSIS — M25.641 STIFFNESS OF RIGHT HAND JOINT: ICD-10-CM

## 2025-06-18 DIAGNOSIS — M62.81 MUSCLE WEAKNESS (GENERALIZED): ICD-10-CM

## 2025-06-18 DIAGNOSIS — M79.641 PAIN OF RIGHT HAND: Primary | ICD-10-CM

## 2025-06-18 NOTE — PROGRESS NOTES
supportive device of involved upper extremity  Pt will be discharged to [facility]  OT Protocols  HEP:   Access Code: [email or web address]  URL: [email or web address]  Date: 05/30/2025  Prepared by: Ro Jenkins    Exercises  - Ulnar Nerve Flossing  - 3 x daily - 7 x weekly - 1 sets - 15 reps - 3 sec hold  - Ulnar Nerve/Median Glide- Low Level  - 3 x daily - 7 x weekly - 1 sets - 15 reps - 3 sec hold  - Finger MP flexion stretch over table top edge  - 5 x daily - 7 x weekly - 1 sets - 10 reps - 10 sec hold  - Hand PROM DIP Flexion   - 5 x daily - 7 x weekly - 1 sets - 10 reps - 10 sec hold  PAST MEDICAL HISTORY/MEDICATIONS/ ALLERGIES   No past medical history on file., No past surgical history on file.   Medications: Reviewed in chart  Allergies: No Known Allergies

## 2025-06-23 ENCOUNTER — TREATMENT (OUTPATIENT)
Age: 35
End: 2025-06-23
Payer: COMMERCIAL

## 2025-06-23 DIAGNOSIS — M25.641 STIFFNESS OF RIGHT HAND JOINT: ICD-10-CM

## 2025-06-23 DIAGNOSIS — M79.641 PAIN OF RIGHT HAND: Primary | ICD-10-CM

## 2025-06-23 DIAGNOSIS — M62.81 MUSCLE WEAKNESS (GENERALIZED): ICD-10-CM

## 2025-06-23 PROCEDURE — 97110 THERAPEUTIC EXERCISES: CPT | Performed by: OCCUPATIONAL THERAPIST

## 2025-06-23 PROCEDURE — 97140 MANUAL THERAPY 1/> REGIONS: CPT | Performed by: OCCUPATIONAL THERAPIST

## 2025-06-23 PROCEDURE — 97022 WHIRLPOOL THERAPY: CPT | Performed by: OCCUPATIONAL THERAPIST
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
## 2025-07-08 ENCOUNTER — TREATMENT (OUTPATIENT)
Age: 35
End: 2025-07-08
Payer: COMMERCIAL

## 2025-07-08 DIAGNOSIS — M62.81 MUSCLE WEAKNESS (GENERALIZED): ICD-10-CM

## 2025-07-08 DIAGNOSIS — M25.641 STIFFNESS OF RIGHT HAND JOINT: ICD-10-CM

## 2025-07-08 DIAGNOSIS — M79.641 PAIN OF RIGHT HAND: Primary | ICD-10-CM

## 2025-07-08 PROCEDURE — 97110 THERAPEUTIC EXERCISES: CPT | Performed by: OCCUPATIONAL THERAPIST

## 2025-07-08 PROCEDURE — 97035 APP MDLTY 1+ULTRASOUND EA 15: CPT | Performed by: OCCUPATIONAL THERAPIST

## 2025-07-08 PROCEDURE — 97022 WHIRLPOOL THERAPY: CPT | Performed by: OCCUPATIONAL THERAPIST

## 2025-07-08 PROCEDURE — 97140 MANUAL THERAPY 1/> REGIONS: CPT | Performed by: OCCUPATIONAL THERAPIST
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
## 2025-07-22 ENCOUNTER — TREATMENT (OUTPATIENT)
Age: 35
End: 2025-07-22
Payer: COMMERCIAL

## 2025-07-22 DIAGNOSIS — M79.641 PAIN OF RIGHT HAND: Primary | ICD-10-CM

## 2025-07-22 DIAGNOSIS — R20.0 NUMBNESS AND TINGLING: ICD-10-CM

## 2025-07-22 DIAGNOSIS — M25.641 STIFFNESS OF RIGHT HAND JOINT: ICD-10-CM

## 2025-07-22 DIAGNOSIS — M62.81 MUSCLE WEAKNESS (GENERALIZED): ICD-10-CM

## 2025-07-22 DIAGNOSIS — R20.2 NUMBNESS AND TINGLING: ICD-10-CM

## 2025-07-22 PROCEDURE — 97140 MANUAL THERAPY 1/> REGIONS: CPT | Performed by: OCCUPATIONAL THERAPIST

## 2025-07-22 PROCEDURE — 97035 APP MDLTY 1+ULTRASOUND EA 15: CPT | Performed by: OCCUPATIONAL THERAPIST

## 2025-07-22 PROCEDURE — 97110 THERAPEUTIC EXERCISES: CPT | Performed by: OCCUPATIONAL THERAPIST
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
